# Patient Record
(demographics unavailable — no encounter records)

---

## 2024-12-16 NOTE — HISTORY OF PRESENT ILLNESS
[FreeTextEntry6] : 10 y/o BIB mother for intermittent fever for one week. States one week ago pt with fever, low grade around 100. Fever would be intermittent. Noted three days ago fever becoming more prominent, temp 102 three days ago, Tmax 104 last night. Cough noted during illness. Denies respiratory distress, no stridor or wheeze noted. + Congestion, denies ear pain, + sore throat. Tolerating PO, normal BMs, good UOP. Seen at  three days ago with negative strep and COVID per mother. No close sick contacts.

## 2024-12-16 NOTE — PHYSICAL EXAM
[Hypertrophied Nasal Mucosa] : hypertrophied nasal mucosa [Erythematous Oropharynx] : erythematous oropharynx [Enlarged Tonsils] : enlarged tonsils [Wheezing] : no wheezing [Tachypnea] : no tachypnea [Subcostal Retractions] : no subcostal retractions [Suprasternal Retractions] : no suprasternal retractions [Enlarged] : enlarged [Anterior Cervical] : anterior cervical [NL] : warm, clear

## 2024-12-16 NOTE — DISCUSSION/SUMMARY
[FreeTextEntry1] : Likely viral illness given past negative strep. Pt well hydrated, no distress. RVP to assess for mycoplasma or other etiology; if positive for mycoplasma will treat accordingly. If fever persistent, consider CXR.   F/u RVP  Ibuprofen/acetaminophen as needed for fever/discomfort Supportive care: cool mist humidifier, increase hydration, honey for cough Flonase/nasal saline for congestion Elevate head of bed Appropriate anticipatory guidance and education given; seek care if symptoms persist or worsen, or if w/dec UOP, signs of distress (reviewed w/parent signs of resp distress), inability to tolerate PO

## 2024-12-16 NOTE — REVIEW OF SYSTEMS
[Fever] : fever [Malaise] : malaise [Headache] : headache [Nasal Congestion] : nasal congestion [Sore Throat] : sore throat [Wheezing] : no wheezing [Cough] : cough [Negative] : Genitourinary

## 2025-04-11 NOTE — REVIEW OF SYSTEMS
[Headache] : headache [Eye Discharge] : no eye discharge [Eye Redness] : no eye redness [Ear Pain] : no ear pain [Nasal Discharge] : no nasal discharge [Nasal Congestion] : nasal congestion [Sinus Pressure] : no sinus pressure [Sore Throat] : sore throat [Myalgia] : myalgia [Negative] : Genitourinary

## 2025-04-11 NOTE — PHYSICAL EXAM
[Erythematous Oropharynx] : erythematous oropharynx [Enlarged Tonsils] : tonsils not enlarged [Exudate] : no exudate [NL] : warm, clear [FreeTextEntry4] : nasal congestion

## 2025-04-11 NOTE — HISTORY OF PRESENT ILLNESS
[de-identified] : sore throat [FreeTextEntry6] : 11 year old boy BIB mother with c/o sore throat, congestion and achiness for the past 3 days. No fever. No SOB, difficulty breathing, chest pain, cough, stridor or wheeze. No n/v/d. No abdominal pain, sore throat or rash. No urinary complaints. Good po/uop/bm. Normal sleep and activity.